# Patient Record
Sex: MALE
[De-identification: names, ages, dates, MRNs, and addresses within clinical notes are randomized per-mention and may not be internally consistent; named-entity substitution may affect disease eponyms.]

---

## 2019-03-29 ENCOUNTER — HOSPITAL ENCOUNTER (INPATIENT)
Dept: HOSPITAL 53 - M ED | Age: 19
LOS: 6 days | Discharge: HOME | DRG: 885 | End: 2019-04-04
Attending: PSYCHIATRY & NEUROLOGY | Admitting: PSYCHIATRY & NEUROLOGY
Payer: COMMERCIAL

## 2019-03-29 VITALS — WEIGHT: 137.35 LBS | BODY MASS INDEX: 19.66 KG/M2 | HEIGHT: 70 IN

## 2019-03-29 VITALS — DIASTOLIC BLOOD PRESSURE: 74 MMHG | SYSTOLIC BLOOD PRESSURE: 122 MMHG

## 2019-03-29 DIAGNOSIS — F41.1: ICD-10-CM

## 2019-03-29 DIAGNOSIS — F33.2: Primary | ICD-10-CM

## 2019-03-29 LAB
ALBUMIN SERPL BCG-MCNC: 4.8 GM/DL (ref 3.2–5.2)
ALT SERPL W P-5'-P-CCNC: 23 U/L (ref 12–78)
AMPHETAMINES UR QL SCN: NEGATIVE
APAP SERPL-MCNC: < 2 UG/ML (ref 10–30)
BARBITURATES UR QL SCN: NEGATIVE
BENZODIAZ UR QL SCN: NEGATIVE
BILIRUB CONJ SERPL-MCNC: 0.3 MG/DL (ref 0–0.2)
BILIRUB SERPL-MCNC: 0.9 MG/DL (ref 0.2–1)
BUN SERPL-MCNC: 19 MG/DL (ref 7–18)
BZE UR QL SCN: NEGATIVE
CALCIUM SERPL-MCNC: 9 MG/DL (ref 8.5–10.1)
CANNABINOIDS UR QL SCN: POSITIVE
CHLORIDE SERPL-SCNC: 103 MEQ/L (ref 98–107)
CO2 SERPL-SCNC: 27 MEQ/L (ref 21–32)
CREAT SERPL-MCNC: 1.02 MG/DL (ref 0.7–1.3)
ETHANOL SERPL-MCNC: < 0.003 % (ref 0–0.01)
GLUCOSE SERPL-MCNC: 103 MG/DL (ref 70–100)
HCT VFR BLD AUTO: 47.4 % (ref 42–52)
HGB BLD-MCNC: 16.1 G/DL (ref 13.5–17.5)
MCH RBC QN AUTO: 31.2 PG (ref 27–33)
MCHC RBC AUTO-ENTMCNC: 34 G/DL (ref 32–36.5)
MCV RBC AUTO: 91.9 FL (ref 80–96)
METHADONE UR QL SCN: NEGATIVE
OPIATES UR QL SCN: NEGATIVE
PCP UR QL SCN: NEGATIVE
PLATELET # BLD AUTO: 304 10^3/UL (ref 150–450)
POTASSIUM SERPL-SCNC: 3.9 MEQ/L (ref 3.5–5.1)
PROT SERPL-MCNC: 7.7 GM/DL (ref 6.4–8.2)
RBC # BLD AUTO: 5.16 10^6/UL (ref 4.3–6.1)
SALICYLATES SERPL-MCNC: < 1.7 MG/DL (ref 5–30)
SODIUM SERPL-SCNC: 140 MEQ/L (ref 136–145)
TSH SERPL DL<=0.005 MIU/L-ACNC: 1.02 UIU/ML (ref 0.46–3.98)
WBC # BLD AUTO: 9.7 10^3/UL (ref 4–10)

## 2019-03-30 VITALS — SYSTOLIC BLOOD PRESSURE: 139 MMHG | DIASTOLIC BLOOD PRESSURE: 88 MMHG

## 2019-03-30 VITALS — DIASTOLIC BLOOD PRESSURE: 64 MMHG | SYSTOLIC BLOOD PRESSURE: 110 MMHG

## 2019-03-30 RX ADMIN — MIRTAZAPINE SCH MG: 15 TABLET, FILM COATED ORAL at 21:51
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
## 2019-03-31 VITALS — DIASTOLIC BLOOD PRESSURE: 81 MMHG | SYSTOLIC BLOOD PRESSURE: 126 MMHG

## 2019-03-31 VITALS — DIASTOLIC BLOOD PRESSURE: 64 MMHG | SYSTOLIC BLOOD PRESSURE: 110 MMHG

## 2019-03-31 VITALS — DIASTOLIC BLOOD PRESSURE: 62 MMHG | SYSTOLIC BLOOD PRESSURE: 136 MMHG

## 2019-03-31 RX ADMIN — MIRTAZAPINE SCH MG: 15 TABLET, FILM COATED ORAL at 22:26

## 2019-03-31 RX ADMIN — SERTRALINE HYDROCHLORIDE SCH MG: 25 TABLET ORAL at 22:26
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
## 2019-04-01 VITALS — SYSTOLIC BLOOD PRESSURE: 132 MMHG | DIASTOLIC BLOOD PRESSURE: 60 MMHG

## 2019-04-01 VITALS — DIASTOLIC BLOOD PRESSURE: 80 MMHG | SYSTOLIC BLOOD PRESSURE: 136 MMHG

## 2019-04-01 RX ADMIN — MIRTAZAPINE SCH MG: 15 TABLET, FILM COATED ORAL at 21:23

## 2019-04-01 RX ADMIN — SERTRALINE HYDROCHLORIDE SCH MG: 25 TABLET ORAL at 21:24

## 2019-04-01 NOTE — MHIPNPDOC
[facility] Progress Note


Progress Note


DATE OF SERVICE: 4/1/19





HISTORY: Patient is a 19 -year-old , male, who according to ED report: "

Pt states "a lot of problems back home ([place])" Pt reports that since he 

went home for Ml, his depression has increased and within the last month 

he found out that his girlfriend and his brother are "you know" not dating but 

"you know". Pt presents with a very flat affect during interview. Pt reports 

that he has had thoughts of suicide and it "just being easier not being here" 

but has never had a plan with these thoughts. Pt reports that recently "I don't 

have good control over my emotions" he reports being "all over the place". Pt 

reports problems with sleep and a decreased appetite. Pt reports a Hx of mental 

health concerns as a child. He was in a "troubled youth" program in his teen ye

ars"..





VITAL SIGNS: See below.





NEW TEST RESULTS: See below





CURRENT MEDICATIONS: See below.





MENTAL STATUS EXAMINATION:


Patient is a 19-year old male, who is alert, cooperative, dressed in hospital 

clothes, looking sad and tired.


Speech: Is normal rhythm, tone, rate and volume.


Language skills are good


Thought processes including: linear, coherent


Thought content: Anxious thoughts about his future in the [employer], finding out his 

brother and girlfriend were in a relationships. He has sad/depressive thoughts 

because he misses his family and friends that are back in [place]


Abstract reasoning, and computation: good 


Description of associations: good


Description of abnormal or psychotic thoughts: Denies AV hallucinations, denies 

thought delusions, denies SI, denies HI


Judgment: Improving


Insight:Fair


Orientation: intact.


Recent and remote memory: intact.


Attention span and concentration: good.


Language: normal.


Fund of knowledge: average.


Mood: depressed. anxious. Affect: congruent with mood, constricted, sad, 

anxious.





DIAGNOSES:


1. Major Depressive disorder, recurrent, severe


2. PTSD ( in reemission)


3. LUKAS


 


ASSESSMENT: Patient states he feels a bit better today but mood still appears 

quite depressed, flat, and anxious due to worrisome and depressive thoughts 

regarding missing his family and friends back home, brother being with his 

girlfriend, and dislike of being in the .  Denies SI though.  States 

he's tolerating his zoloft but has yet to notice if it's beneficial.  Denies 

PTSD symptoms.  States he's spoken with his Pily and they are supportive of him. 

Encouraged to continue to have communication and honesty with them.  States he's

attending groups and finding them beneficial.  Endorses anxiety and encouraged 

to take vistaril prn to relieve it if he feels he need to.  Sleeping well with 

remeron. Denies SI/HI, hallucinations, delusions.  Feels safe here.





MANAGEMENT PLAN: 


Zoloft to 75 mgs 


vistaril 50mg q6hr prn anxiety


remeron 30mg qhs





TIME SPENT: 30 minutes.





Vital Signs





Vital Signs








  Date Time  Temp Pulse Resp B/P (MAP) Pulse Ox O2 Delivery O2 Flow Rate FiO2


 


4/1/19 06:45 97.6 65 14 132/60 (84)    


 


3/31/19 08:17      Room Air  


 


3/29/19 22:53     96   











Current Medications





Current Medications


Acetaminophen (Tylenol Tab) 650 mg Q6HP  PRN PO HEADACHE or DISCOMFORT;  Start 

3/29/19 at 20:30


Al Hydrox/Mg Hydrox/Simethicone (Mylanta) 30 ml Q4HP  PRN PO 

HEARTBURN/INDIGESTION;  Start 3/29/19 at 20:30


Home Med (Med Rec Complete!)  ASDIRECTED XX ;  Start 3/29/19 at 21:00;  Stop 

3/29/19 at 21:00;  Status DC


Hydroxyzine HCl (Atarax) 50 mg Q4HP  PRN PO ANXIETY/AGITATION;  Start 3/29/19 at

20:30


Magnesium Hydroxide (Milk Of Magnesia) 30 ml DAILYPRN  PRN PO CONSTIPATION;  

Start 3/29/19 at 20:30


Mirtazapine (Remeron) 15 mg QHS PO  Last administered on 3/29/19at 23:02;  Start

3/29/19 at 21:00;  Stop 3/30/19 at 14:13;  Status DC


Mirtazapine (Remeron) 30 mg QHS PO  Last administered on 3/31/19at 22:26;  Start

3/30/19 at 21:00


Sertraline HCl (Zoloft) 50 mg QHS PO  Last administered on 3/30/19at 21:51;  

Start 3/30/19 at 21:00;  Stop 3/31/19 at 13:32;  Status DC


Sertraline HCl (Zoloft) 75 mg QHS PO  Last administered on 3/31/19at 22:26;  

Start 3/31/19 at 21:00





Allergies


Coded Allergies:  


     No Known Allergies (Unverified , 3/29/19)











BLAIR GRIMALDO DO          Apr 1, 2019 10:55 am

## 2019-04-02 VITALS — DIASTOLIC BLOOD PRESSURE: 87 MMHG | SYSTOLIC BLOOD PRESSURE: 134 MMHG

## 2019-04-02 VITALS — DIASTOLIC BLOOD PRESSURE: 66 MMHG | SYSTOLIC BLOOD PRESSURE: 115 MMHG

## 2019-04-02 RX ADMIN — SERTRALINE HYDROCHLORIDE SCH MG: 25 TABLET ORAL at 20:45

## 2019-04-02 RX ADMIN — MIRTAZAPINE SCH MG: 15 TABLET, FILM COATED ORAL at 20:45

## 2019-04-02 NOTE — HPE
DATE OF ADMISSION:  03/29/2019

 

HISTORY OF PRESENT ILLNESS: Please refer to the psychiatric history and

evaluation for further details on this admission. This examination and history

was intended for treatment followup or consult on this 19-year-old male.

 

ALLERGIES: No known allergies.

 

PRIMARY CARE PROVIDER: [facility].

 

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

once a month. He does not smoke cigarettes, recreational drug use he rarely uses

marijuana.

 

PAST MEDICAL  HISTORY: Depression.

 

PAST SURGICAL HISTORY: Repaired tendon index finger right hand, abdominal surgery

as a toddler. He does not quite remember what. He does have a small mid abdominal

incision.

 

HOME MEDICATIONS: None.

 

FAMILY HISTORY: Noncontributory.

 

REVIEW OF SYSTEMS: Unremarkable. He had no medical complaints.

 

PHYSICAL EXAMINATION: 19-year-old cooperative male in no acute distress. Height

70 inches, Weight 62.8 kg, BMI 20.2, blood pressure 110/64, pulse 60, temperature

97.2, respirations 14, Oxygen saturation 97%. Patient is alert and oriented times

three. Pupils are equal and reactive to light. EOM's are intact. Conjunctiva is

normal. No facial asymmetry. Pharynx pink and moist. Tongue is midline. Neck is

supple without lymphadenopathy, no thyromegaly without bruit. Chest is clear to

auscultation without wheeze or retraction, heart is regular. Abdomen is benign.

Bowel sounds are positive.  and rectal not done. Extremities show equal

strength and full range of motion. No cyanosis, clubbing or edema. Peripheral

pulses are equal and palpable bilaterally. Skin is warm and dry.

 

IMPRESSION AND PLAN: Psychiatric plan for psychiatry. No acute medical issues.

## 2019-04-02 NOTE — MHIPNPDOC
[facility] Progress Note


Progress Note


DATE OF SERVICE: 4/2/19





HISTORY: Per admit note: "Patient is a 19 -year-old , male, who 

according to ED report: "Pt states "a lot of problems back home ([place])" Pt

reports that since he went home for [place], his depression has increased and 

within the last month he found out that his girlfriend and his brother are "you 

know" not dating but "you know". Pt presents with a very flat affect during 

interview. Pt reports that he has had thoughts of suicide and it "just being 

easier not being here" but has never had a plan with these thoughts. Pt reports 

that recently "I don't have good control over my emotions" he reports being "all

over the place". Pt reports problems with sleep and a decreased appetite. Pt 

reports a Hx of mental health concerns as a child. He was in a "troubled youth" 

program in his teen years".





VITAL SIGNS: See below.





NEW TEST RESULTS: See below





CURRENT MEDICATIONS: See below.





MENTAL STATUS EXAMINATION:


Patient is a 19-year old male, who is alert, cooperative, dressed in hospital 

clothes, looking sad and tired.


Speech: Is normal rhythm, tone, rate and volume.


Language skills are good


Thought processes including: linear, coherent


Thought content: Anxious thoughts about returning to the [employer]. He has 

sad/depressive thoughts because he misses his family and friends that are back 

in [place]


Abstract reasoning, and computation: good 


Description of associations: good


Description of abnormal or psychotic thoughts: Denies AV hallucinations, denies 

thought delusions, denies SI, denies HI


Judgment: Improving


Insight:Fair


Orientation: intact.


Recent and remote memory: intact.


Attention span and concentration: good.


Language: normal.


Fund of knowledge: average.


Mood: depressed. anxious. Affect: congruent with mood, constricted, sad, 

anxious.





DIAGNOSES:


1. Major Depressive disorder, recurrent, severe


2. PTSD ( in reemission)


3. LUKAS


 


ASSESSMENT: Patient states he feels "the same" today and appears quite 

depressed, flat, and anxious due to worrisome and depressive thoughts regarding 

his dislike of being in the  and having to eventually return to duty.  

Pt encouraged to reach out to his Pily to improve his anxiety regarding fear of 

returning to duty due to dislike as they may be able to help him and improve 

things overall.  Told he will have to go back though as he did enlist to serve 

for a period of time.  Denies SI today.  States he's tolerating his zoloft but 

has yet to notice if it's beneficial.  Denies PTSD symptoms.  States he's spoken

with his Pily and they are supportive of him.  Encouraged to continue to have 

communication and honesty with them.  States he's attending groups and finding 

them beneficial.  Endorses anxiety and encouraged to take vistaril prn to 

relieve it if he feels he need to.  Sleeping well with remeron. Denies SI/HI, 

hallucinations, delusions.  Feels safe here.





MANAGEMENT PLAN: 


Zoloft to 75 mgs 


vistaril 50mg q6hr prn anxiety


remeron 30mg qhs





TIME SPENT: 30 minutes.





Vital Signs





Vital Signs








  Date Time  Temp Pulse Resp B/P (MAP) Pulse Ox O2 Delivery O2 Flow Rate FiO2


 


4/2/19 06:30 97.7 63 12 115/66 (82)    


 


3/31/19 08:17      Room Air  


 


3/29/19 22:53     96   











Current Medications





Current Medications


Acetaminophen (Tylenol Tab) 650 mg Q6HP  PRN PO HEADACHE or DISCOMFORT;  Start 

3/29/19 at 20:30


Al Hydrox/Mg Hydrox/Simethicone (Mylanta) 30 ml Q4HP  PRN PO 

HEARTBURN/INDIGESTION;  Start 3/29/19 at 20:30


Home Med (Med Rec Complete!)  ASDIRECTED XX ;  Start 3/29/19 at 21:00;  Stop 

3/29/19 at 21:00;  Status DC


Hydroxyzine HCl (Atarax) 50 mg Q4HP  PRN PO ANXIETY/AGITATION;  Start 3/29/19 at

20:30


Magnesium Hydroxide (Milk Of Magnesia) 30 ml DAILYPRN  PRN PO CONSTIPATION;  

Start 3/29/19 at 20:30


Mirtazapine (Remeron) 15 mg QHS PO  Last administered on 3/29/19at 23:02;  Start

3/29/19 at 21:00;  Stop 3/30/19 at 14:13;  Status DC


Mirtazapine (Remeron) 30 mg QHS PO  Last administered on 4/1/19at 21:23;  Start 

3/30/19 at 21:00


Sertraline HCl (Zoloft) 50 mg QHS PO  Last administered on 3/30/19at 21:51;  

Start 3/30/19 at 21:00;  Stop 3/31/19 at 13:32;  Status DC


Sertraline HCl (Zoloft) 75 mg QHS PO  Last administered on 4/1/19at 21:24;  

Start 3/31/19 at 21:00





Allergies


Coded Allergies:  


     No Known Allergies (Unverified , 3/29/19)











BLAIR GRIMALDO DO          Apr 2, 2019 10:16 am

## 2019-04-03 VITALS — DIASTOLIC BLOOD PRESSURE: 91 MMHG | SYSTOLIC BLOOD PRESSURE: 140 MMHG

## 2019-04-03 VITALS — SYSTOLIC BLOOD PRESSURE: 115 MMHG | DIASTOLIC BLOOD PRESSURE: 56 MMHG

## 2019-04-03 RX ADMIN — SERTRALINE HYDROCHLORIDE SCH MG: 25 TABLET ORAL at 20:37

## 2019-04-03 RX ADMIN — MIRTAZAPINE SCH MG: 15 TABLET, FILM COATED ORAL at 20:37

## 2019-04-03 NOTE — MHIPNPDOC
[facility] Progress Note


Progress Note


DATE OF SERVICE: 4/3/19





HISTORY: Per admit note: "Patient is a 19 -year-old , male, who 

according to ED report: "Pt states "a lot of problems back home ([place])" Pt

reports that since he went home for [place], his depression has increased and 

within the last month he found out that his girlfriend and his brother are "you 

know" not dating but "you know". Pt presents with a very flat affect during 

interview. Pt reports that he has had thoughts of suicide and it "just being 

easier not being here" but has never had a plan with these thoughts. Pt reports 

that recently "I don't have good control over my emotions" he reports being "all

over the place". Pt reports problems with sleep and a decreased appetite. Pt 

reports a Hx of mental health concerns as a child. He was in a "troubled youth" 

program in his teen years".





VITAL SIGNS: See below.





NEW TEST RESULTS: See below





CURRENT MEDICATIONS: See below.





MENTAL STATUS EXAMINATION:


Patient is a 19-year old male, who is alert, cooperative, dressed in hospital 

clothes, looking sad and tired.


Speech: Is normal rhythm, tone, rate and volume.


Language skills are good


Thought processes including: linear, coherent


Thought content: Anxious thoughts about returning to the [employer] that he states are

improving. less sad/depressive thoughts because he misses his family and friends




Abstract reasoning, and computation: good 


Description of associations: good


Description of abnormal or psychotic thoughts: Denies AV hallucinations, denies 

thought delusions, denies SI, denies HI


Judgment: Improving


Insight:Fair


Orientation: intact.


Recent and remote memory: intact.


Attention span and concentration: good.


Language: normal.


Fund of knowledge: average.


Mood: less depressed. less anxious. Affect: congruent with mood, less 

constricted, sad, anxious.





DIAGNOSES:


1. Major Depressive disorder, recurrent, severe


2. PTSD ( in reemission)


3. LUKAS


 


ASSESSMENT: Patient states he feels "better" today and appears less depressed, 

flat, and anxious.  Continues to endorse mostly anticipatory anxiety regard 

returning to  duty as feels lonely in [place] b/c he has yet to make 

close friends.  States he knows he has to go back and that he's been using copi

ng mechanisms he learned in groups to improve anxiety.  States deep breathing is

helpful for him.    Pt encouraged to reach out to his Pily to improve his anxiety

regarding fear of returning to duty due to dislike as they may be able to help 

him and improve things overall.  Knows he will have to go back though as he did 

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  Denies 

SI today.  States he's tolerating his zoloft and feels it's beneficial.  Denies 

PTSD symptoms.  States he's spoken with his Pily and they are supportive of him. 

Encouraged to continue to have communication and honesty with them.  States he's

attending groups and finding them beneficial.   Sleeping well with remeron. 

Denies SI/HI, hallucinations, delusions.  Feels safe here.





MANAGEMENT PLAN: 


Zoloft to 75 mgs 


vistaril 50mg q6hr prn anxiety


remeron 30mg qhs





TIME SPENT: 30 minutes.





Vital Signs





Vital Signs








  Date Time  Temp Pulse Resp B/P (MAP) Pulse Ox O2 Delivery O2 Flow Rate FiO2


 


4/3/19 06:29 97.6 50 12 115/56 (75)    


 


3/31/19 08:17      Room Air  


 


3/29/19 22:53     96   











Current Medications





Current Medications


Acetaminophen (Tylenol Tab) 650 mg Q6HP  PRN PO HEADACHE or DISCOMFORT;  Start 

3/29/19 at 20:30


Al Hydrox/Mg Hydrox/Simethicone (Mylanta) 30 ml Q4HP  PRN PO 

HEARTBURN/INDIGESTION;  Start 3/29/19 at 20:30


Home Med (Med Rec Complete!)  ASDIRECTED XX ;  Start 3/29/19 at 21:00;  Stop 

3/29/19 at 21:00;  Status DC


Hydroxyzine HCl (Atarax) 50 mg Q4HP  PRN PO ANXIETY/AGITATION;  Start 3/29/19 at

20:30


Magnesium Hydroxide (Milk Of Magnesia) 30 ml DAILYPRN  PRN PO CONSTIPATION;  

Start 3/29/19 at 20:30


Mirtazapine (Remeron) 15 mg QHS PO  Last administered on 3/29/19at 23:02;  Start

3/29/19 at 21:00;  Stop 3/30/19 at 14:13;  Status DC


Mirtazapine (Remeron) 30 mg QHS PO  Last administered on 4/2/19at 20:45;  Start 

3/30/19 at 21:00


Sertraline HCl (Zoloft) 50 mg QHS PO  Last administered on 3/30/19at 21:51;  

Start 3/30/19 at 21:00;  Stop 3/31/19 at 13:32;  Status DC


Sertraline HCl (Zoloft) 75 mg QHS PO  Last administered on 4/2/19at 20:45;  

Start 3/31/19 at 21:00





Allergies


Coded Allergies:  


     No Known Allergies (Unverified , 3/29/19)











BLAIR GRIMALDO DO           Apr 3, 2019 9:57 am

## 2019-04-04 VITALS — SYSTOLIC BLOOD PRESSURE: 117 MMHG | DIASTOLIC BLOOD PRESSURE: 58 MMHG

## 2019-04-04 NOTE — MHDSPDOC
[facility] Discharge Summary


Discharge Summary


DATE OF ADMISSION: Mar 29, 2019 at 8:24 pm 


DATE OF DISCHARGE: April 4, 2019





DISCHARGE DIAGNOSES:


1. Major Depressive disorder, recurrent, severe


2. PTSD ( in reemission)


3. LUKAS





REASON FOR ADMISSION: Per admit note: "Patient is a 19 -year-old , male,

who according to ED report: "Pt states "a lot of problems back home 

([place])" Pt reports that since he went home for [place], his depression 

has increased and within the last month he found out that his girlfriend and his

brother are "you know" not dating but "you know". Pt presents with a very flat 

affect during interview. Pt reports that he has had thoughts of suicide and it 

"just being easier not being here" but has never had a plan with these thoughts.

Pt reports that recently "I don't have good control over my emotions" he reports

being "all over the place". Pt reports problems with sleep and a decreased 

appetite. Pt reports a Hx of mental health concerns as a child. He was in a 

"troubled youth" program in his teen years".





CONSULTANTS INVOLVED: none





TREATMENT AND PROGRESS ON THE UNIT :Pt was admitted to [facility], seen for 

psychiatric assessment and restarted on his outpatient medication zoloft 

increased to 75mg daily and remeron 30mg qhs.  He was provided vistaril 50mg 

q6hr prn anxiety.  Pt found his medications beneficial and tolerated them well. 

He attended groups daily during his stay.  His symptoms improved with treatment.

 On day of discharge he denied depression, anxiety, insomnia, SI/HI, 

hallucinations, delusions.  He was discharged home after [facility] meeting with 

follow-up at [facility].  He felt safe for discharge.





DISCHARGE ASSESSMENT:  Patient states he feels "good" and feels ready to be d/c 

home with his Pily today. He appears euthymic, full, calm.  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  States deep 

breathing is helpful for his anxiety and plans to continue to use it to cope 

once he goes home.    States he's tolerating his zoloft and feels it's 

beneficial. He attended groups during his stay which he found beneficial. Denies

PTSD symptoms.  States he's spoken with his Pily and they are supportive of him. 

Sleeping well with remeron. Denies depression, anxiety, insomnia, SI/HI, 

hallucinations, delusions.  Feels safe to be d/c home with Pily.





MENTAL STATUS EXAMINATION ON DISCHARGE: 


Patient is a 19-year old male, who is alert, cooperative, dressed in hospital 

clothes


Speech: Is normal rhythm, tone, rate and volume.


Language skills are good


Thought processes including: linear, logical, coherent


Thought content: denies SI/HI, future oriented


Abstract reasoning, and computation: good 


Description of associations: good


Description of abnormal or psychotic thoughts: Denies AV hallucinations, denies 

thought delusions, denies SI, denies HI


Judgment: good


Insight:good


Orientation: intact.


Recent and remote memory: intact.


Attention span and concentration: good.


Language: normal.


Fund of knowledge: average.


Mood: euthymic, full. Affect: congruent with mood, euthymic, full





MEDICATIONS ON DISCHARGE:


Zoloft to 75 mgs 


vistaril 50mg q6hr prn anxiety


remeron 30mg qhs





PLAN/FOLLOWUP ARRANGEMENTS: D/c home with Pily with follow-up at [facility].





The amount of time spent in the coordination of care for this patient was 

approximately 30 minutes.





Vital Signs/I&Os





Vital Signs








  Date Time  Temp Pulse Resp B/P (MAP) Pulse Ox O2 Delivery O2 Flow Rate FiO2


 


4/4/19 06:29 97.3 54 14 117/58 (77)    


 


3/31/19 08:17      Room Air  


 


3/29/19 22:53     96   











Medications


No Active Prescriptions or Reported Meds





Allergies


Coded Allergies:  


     No Known Allergies (Unverified , 3/29/19)











BLAIR GRIMALDO DO           Apr 4, 2019 8:54 am